# Patient Record
Sex: FEMALE | Race: WHITE | NOT HISPANIC OR LATINO | Employment: UNEMPLOYED | URBAN - METROPOLITAN AREA
[De-identification: names, ages, dates, MRNs, and addresses within clinical notes are randomized per-mention and may not be internally consistent; named-entity substitution may affect disease eponyms.]

---

## 2017-05-04 PROBLEM — E11.9 CONTROLLED TYPE 2 DIABETES MELLITUS WITHOUT COMPLICATION, WITHOUT LONG-TERM CURRENT USE OF INSULIN: Status: ACTIVE | Noted: 2017-04-09

## 2017-08-08 PROBLEM — E78.00 HYPERCHOLESTEROLEMIA: Status: ACTIVE | Noted: 2017-08-08

## 2018-02-09 PROBLEM — I25.10 CORONARY ARTERY DISEASE INVOLVING NATIVE CORONARY ARTERY OF NATIVE HEART WITHOUT ANGINA PECTORIS: Status: ACTIVE | Noted: 2018-02-09

## 2019-06-02 PROBLEM — E66.9 OBESITY, UNSPECIFIED: Status: ACTIVE | Noted: 2019-06-02

## 2019-06-02 PROBLEM — K64.8 HEMORRHOIDS, INTERNAL: Status: ACTIVE | Noted: 2018-05-11

## 2019-06-02 PROBLEM — K57.30 DIVERTICULOSIS OF SIGMOID COLON: Status: ACTIVE | Noted: 2018-05-11

## 2019-06-04 PROBLEM — Z79.82 ASPIRIN LONG-TERM USE: Status: ACTIVE | Noted: 2019-06-04

## 2019-06-04 PROBLEM — J30.1 SEASONAL ALLERGIC RHINITIS DUE TO POLLEN: Status: ACTIVE | Noted: 2019-06-04

## 2019-06-04 PROBLEM — Z79.899 ON STATIN THERAPY: Status: ACTIVE | Noted: 2019-06-04

## 2019-08-19 PROBLEM — N81.4 CYSTOCELE WITH PROLAPSE: Status: ACTIVE | Noted: 2019-08-19

## 2019-08-19 PROBLEM — N81.9 FEMALE GENITAL PROLAPSE: Status: ACTIVE | Noted: 2019-08-19

## 2019-08-19 PROBLEM — N99.3 VAGINAL VAULT PROLAPSE AFTER HYSTERECTOMY: Status: ACTIVE | Noted: 2019-08-19

## 2020-07-03 PROBLEM — E11.29 MICROALBUMINURIA DUE TO TYPE 2 DIABETES MELLITUS: Chronic | Status: ACTIVE | Noted: 2020-07-03

## 2020-07-03 PROBLEM — R80.9 MICROALBUMINURIA DUE TO TYPE 2 DIABETES MELLITUS: Chronic | Status: ACTIVE | Noted: 2020-07-03

## 2020-12-26 PROBLEM — E11.9 CONTROLLED TYPE 2 DIABETES MELLITUS WITHOUT COMPLICATION, WITHOUT LONG-TERM CURRENT USE OF INSULIN: Chronic | Status: ACTIVE | Noted: 2017-04-09

## 2021-01-17 PROBLEM — M85.852 OSTEOPENIA OF NECKS OF BOTH FEMURS: Chronic | Status: ACTIVE | Noted: 2021-01-17

## 2021-01-17 PROBLEM — M85.851 OSTEOPENIA OF NECKS OF BOTH FEMURS: Chronic | Status: ACTIVE | Noted: 2021-01-17

## 2021-10-20 PROBLEM — N99.3 VAGINAL VAULT PROLAPSE AFTER HYSTERECTOMY: Status: RESOLVED | Noted: 2019-08-19 | Resolved: 2021-10-20

## 2021-10-20 PROBLEM — N81.9 FEMALE GENITAL PROLAPSE: Status: RESOLVED | Noted: 2019-08-19 | Resolved: 2021-10-20

## 2021-10-20 PROBLEM — K64.8 HEMORRHOIDS, INTERNAL: Status: RESOLVED | Noted: 2018-05-11 | Resolved: 2021-10-20

## 2021-10-20 PROBLEM — N81.4 CYSTOCELE WITH PROLAPSE: Status: RESOLVED | Noted: 2019-08-19 | Resolved: 2021-10-20

## 2021-12-31 PROBLEM — I25.10 CORONARY ARTERY DISEASE INVOLVING NATIVE CORONARY ARTERY OF NATIVE HEART WITHOUT ANGINA PECTORIS: Chronic | Status: ACTIVE | Noted: 2018-02-09

## 2021-12-31 PROBLEM — J30.1 SEASONAL ALLERGIC RHINITIS DUE TO POLLEN: Chronic | Status: ACTIVE | Noted: 2019-06-04

## 2021-12-31 PROBLEM — E66.9 OBESITY, UNSPECIFIED: Status: RESOLVED | Noted: 2019-06-02 | Resolved: 2021-12-31

## 2021-12-31 PROBLEM — E78.00 HYPERCHOLESTEROLEMIA: Chronic | Status: ACTIVE | Noted: 2017-08-08

## 2021-12-31 PROBLEM — I15.2 HYPERTENSION ASSOCIATED WITH TYPE 2 DIABETES MELLITUS: Status: ACTIVE | Noted: 2021-12-31

## 2021-12-31 PROBLEM — E11.69 TYPE 2 DIABETES MELLITUS WITH HYPERCHOLESTEROLEMIA: Chronic | Status: ACTIVE | Noted: 2017-08-08

## 2021-12-31 PROBLEM — E11.59 HYPERTENSION ASSOCIATED WITH TYPE 2 DIABETES MELLITUS: Status: ACTIVE | Noted: 2021-12-31

## 2022-01-03 PROBLEM — F43.89 CAREGIVER STRESS SYNDROME: Status: ACTIVE | Noted: 2022-01-03

## 2022-01-03 PROBLEM — R45.7 CAREGIVER STRESS SYNDROME: Status: ACTIVE | Noted: 2022-01-03

## 2022-02-14 PROBLEM — F41.9 ANXIETY: Status: ACTIVE | Noted: 2022-02-14

## 2023-01-10 PROBLEM — K63.5 POLYP OF COLON: Status: ACTIVE | Noted: 2023-01-10

## 2023-08-17 PROBLEM — K57.30 DIVERTICULOSIS OF SIGMOID COLON: Chronic | Status: ACTIVE | Noted: 2018-05-11

## 2023-08-17 PROBLEM — E11.59 CONTROLLED TYPE 2 DIABETES MELLITUS WITH CIRCULATORY DISORDER, WITHOUT LONG-TERM CURRENT USE OF INSULIN: Chronic | Status: ACTIVE | Noted: 2017-04-09

## 2024-02-09 PROBLEM — R94.39 ABNORMAL STRESS TEST: Status: ACTIVE | Noted: 2024-02-09

## 2024-02-23 PROBLEM — E78.2 MIXED HYPERLIPIDEMIA: Status: ACTIVE | Noted: 2024-02-23

## 2024-02-27 ENCOUNTER — TELEPHONE (OUTPATIENT)
Dept: HEMATOLOGY/ONCOLOGY | Facility: CLINIC | Age: 69
End: 2024-02-27
Payer: OTHER GOVERNMENT

## 2024-02-29 ENCOUNTER — TELEPHONE (OUTPATIENT)
Dept: HEMATOLOGY/ONCOLOGY | Facility: CLINIC | Age: 69
End: 2024-02-29
Payer: OTHER GOVERNMENT

## 2024-03-27 ENCOUNTER — TELEPHONE (OUTPATIENT)
Dept: HEMATOLOGY/ONCOLOGY | Facility: CLINIC | Age: 69
End: 2024-03-27
Payer: OTHER GOVERNMENT

## 2024-03-27 NOTE — TELEPHONE ENCOUNTER
Called pt and scheduled heme appt.  Scheduled first available benign heme appt, May 31st.  Pt confirmed the appt date time and location.

## 2024-08-23 PROBLEM — I15.2 HYPERTENSION ASSOCIATED WITH TYPE 2 DIABETES MELLITUS: Chronic | Status: ACTIVE | Noted: 2021-12-31

## 2024-08-23 PROBLEM — E11.59 HYPERTENSION ASSOCIATED WITH TYPE 2 DIABETES MELLITUS: Chronic | Status: ACTIVE | Noted: 2021-12-31

## 2024-08-23 PROBLEM — Z79.899 ON STATIN THERAPY: Chronic | Status: ACTIVE | Noted: 2019-06-04

## 2024-08-23 PROBLEM — E78.2 MIXED HYPERLIPIDEMIA: Chronic | Status: ACTIVE | Noted: 2024-02-23

## 2025-04-10 PROBLEM — E11.9 TYPE 2 DIABETES MELLITUS: Status: ACTIVE | Noted: 2017-08-08

## 2025-04-10 PROBLEM — K52.9 GASTROENTERITIS: Status: ACTIVE | Noted: 2025-04-10

## 2025-04-10 PROBLEM — A41.9 SEPSIS: Status: ACTIVE | Noted: 2025-04-10

## 2025-04-10 PROBLEM — K52.9 COLITIS: Status: ACTIVE | Noted: 2025-04-10

## 2025-04-10 PROBLEM — Z71.89 ACP (ADVANCE CARE PLANNING): Status: ACTIVE | Noted: 2025-04-10

## 2025-04-11 PROBLEM — E87.21 ACUTE METABOLIC ACIDOSIS: Status: ACTIVE | Noted: 2025-04-11

## 2025-04-11 PROBLEM — E87.6 HYPOKALEMIA: Status: ACTIVE | Noted: 2025-04-11

## 2025-08-28 ENCOUNTER — PATIENT MESSAGE (OUTPATIENT)
Dept: PSYCHIATRY | Facility: CLINIC | Age: 70
End: 2025-08-28
Payer: OTHER GOVERNMENT